# Patient Record
(demographics unavailable — no encounter records)

---

## 2025-03-21 NOTE — ASSESSMENT
[FreeTextEntry1] : BPH/LUTS  ED  Increase flomax to 0.8 mg Cialis 5 mg daily (sent to [facility])  PVR to r/o retention - 0 cc UA/UCx  counseled on continued exercise/weight loss  return in 6 weeks

## 2025-03-21 NOTE — LETTER BODY
[Dear  ___] : Dear  [unfilled], [FreeTextEntry2] : Juan Pablo Mcgarry  [address], [address] [place] [address] [FreeTextEntry1] : I had the pleasure of seeing your patient,  HERMILA FORRESTER, a 66 year old man, in the office in consultation today. Please see the attached note for full details.  Thank you very much for allowing me to participate in the care of this patient. If you have any questions please feel free to call me at any time.    Sincerely yours,    Elier Hernandez MD, DEANNA Director, Male Fertility and Microsurgery  of [facility]

## 2025-03-21 NOTE — HISTORY OF PRESENT ILLNESS
[FreeTextEntry1] : 66M presents as new visit for worsening LUTS  Referred by: Dr. Mcgarry (PCP)    BPH: Urinates q1-2 hour. Weaker stream, feels like he is "pushing urine" more than he used to. No dysuria or hematuria. Feels empty afterwards. No urgency. No incontinence.  Started tamsulosin 0.4mg ~2 years ago, and has noticed an improvement from prior.   ED:  Reports ED - though able to achieve erection sufficient for penetration with "enough work." Hasn't tried PDE5i, though would be interested.   PSA:  Gets PSA checked with every annual visit with K[facility].   Gained 30 lbs with COVID. Has "occupational exercise" with traveling/walking for work, started working out at gym.   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

## 2025-03-21 NOTE — PHYSICAL EXAM
[General Appearance - Well Developed] : well developed [General Appearance - Well Nourished] : well nourished [] : no respiratory distress [Abdomen Soft] : soft [Abdomen Tenderness] : non-tender [Costovertebral Angle Tenderness] : no ~M costovertebral angle tenderness [Urethral Meatus] : meatus normal [Penis Abnormality] : normal circumcised penis [Scrotum] : the scrotum was normal [Testes Tenderness] : no tenderness of the testes [de-identified] : 10 cc testicles b/l, palpable vas b/l, no varicocele or hernias ; 30+g prostate non-tender, no nodules or masses

## 2025-04-25 NOTE — PHYSICAL EXAM
[Normal Appearance] : normal appearance [Well Groomed] : well groomed [General Appearance - In No Acute Distress] : no acute distress [Respiration, Rhythm And Depth] : normal respiratory rhythm and effort [Exaggerated Use Of Accessory Muscles For Inspiration] : no accessory muscle use [Normal Station and Gait] : the gait and station were normal for the patient's age [] : no rash [No Focal Deficits] : no focal deficits [Oriented To Time, Place, And Person] : oriented to person, place, and time [Mood] : the mood was normal [Affect] : the affect was normal

## 2025-04-25 NOTE — ASSESSMENT
[FreeTextEntry1] : 65yo M BPH, ED mild LUTS. Bothersome. Has not yet started daily tadalafil  -continue tamsulosin 0.8mg -trial daily tadalafil 5mg -call office to discuss response in 1 month

## 2025-04-25 NOTE — HISTORY OF PRESENT ILLNESS
[FreeTextEntry1] : HERMILA FORRESTER is a 66 year M presenting on 04/25/2025 PMH: BPH, CAD, HLD, RBBB, cardiac cath w/ stent placement (2012), laparoscopic cholecystectomy (~ 7 years ago) Meds: baby aspirin, plavix, rosuvastatin, tamsulosin 0.4 mg, D3, and CoQ10  1 mo F/U  BPH: tamsulosin increased to 0.8mg for frequency and weak stream Slightly less frequency Nocturia x1-2. Feels that he is not emptying completely. Denies hematuria  ED: tadalafil 5mg daily. Has not yet started baseline difficulty with penetration at times  PSA trend: 1.66 (1/06/2025) 1.13 (10/10/2019)